# Patient Record
Sex: FEMALE | Race: WHITE | NOT HISPANIC OR LATINO
[De-identification: names, ages, dates, MRNs, and addresses within clinical notes are randomized per-mention and may not be internally consistent; named-entity substitution may affect disease eponyms.]

---

## 2023-08-16 PROBLEM — Z00.00 ENCOUNTER FOR PREVENTIVE HEALTH EXAMINATION: Status: ACTIVE | Noted: 2023-08-16

## 2023-09-20 ENCOUNTER — APPOINTMENT (OUTPATIENT)
Dept: VASCULAR SURGERY | Facility: CLINIC | Age: 28
End: 2023-09-20

## 2023-10-09 ENCOUNTER — NON-APPOINTMENT (OUTPATIENT)
Age: 28
End: 2023-10-09

## 2023-10-09 ENCOUNTER — APPOINTMENT (OUTPATIENT)
Dept: HEART AND VASCULAR | Facility: CLINIC | Age: 28
End: 2023-10-09
Payer: COMMERCIAL

## 2023-10-09 ENCOUNTER — APPOINTMENT (OUTPATIENT)
Dept: VASCULAR SURGERY | Facility: CLINIC | Age: 28
End: 2023-10-09
Payer: COMMERCIAL

## 2023-10-09 PROCEDURE — 99205 OFFICE O/P NEW HI 60 MIN: CPT

## 2023-10-09 PROCEDURE — 93971 EXTREMITY STUDY: CPT

## 2023-10-09 RX ORDER — NORGESTREL AND ETHINYL ESTRADIOL 0.3-0.03MG
0.3-3 KIT ORAL
Refills: 0 | Status: ACTIVE | COMMUNITY

## 2023-10-18 ENCOUNTER — APPOINTMENT (OUTPATIENT)
Dept: MRI IMAGING | Facility: CLINIC | Age: 28
End: 2023-10-18

## 2023-10-24 ENCOUNTER — APPOINTMENT (OUTPATIENT)
Dept: MRI IMAGING | Facility: CLINIC | Age: 28
End: 2023-10-24
Payer: COMMERCIAL

## 2023-10-24 ENCOUNTER — OUTPATIENT (OUTPATIENT)
Dept: OUTPATIENT SERVICES | Facility: HOSPITAL | Age: 28
LOS: 1 days | End: 2023-10-24

## 2023-10-24 PROCEDURE — 72198 MR ANGIO PELVIS W/O & W/DYE: CPT | Mod: 26

## 2023-10-31 ENCOUNTER — TRANSCRIPTION ENCOUNTER (OUTPATIENT)
Age: 28
End: 2023-10-31

## 2024-02-12 ENCOUNTER — APPOINTMENT (OUTPATIENT)
Dept: HEART AND VASCULAR | Facility: CLINIC | Age: 29
End: 2024-02-12
Payer: COMMERCIAL

## 2024-02-12 DIAGNOSIS — I82.890 ACUTE EMBOLISM AND THROMBOSIS OF OTHER SPECIFIED VEINS: ICD-10-CM

## 2024-02-12 LAB
APTT BLD: 29.4 SEC
DEPRECATED D DIMER PPP IA-ACNC: <150 NG/ML DDU
INR PPP: 0.94 RATIO
PT BLD: 10.6 SEC

## 2024-02-12 PROCEDURE — 99213 OFFICE O/P EST LOW 20 MIN: CPT

## 2024-02-13 LAB
FACT IX ACT/NOR PPP: 89 %
FACT X ACT/NOR PPP: 97 %
FACT XI ACT/NOR PPP: 119 %
FACT XII PPP CHRO-ACNC: 137 %

## 2024-02-15 NOTE — PHYSICAL EXAM
[Alert] : alert [No Acute Distress] : no acute distress [Well Nourished] : well nourished [Well Developed] : well developed [Normal Sclera/Conjunctiva] : normal sclera/conjunctiva [EOMI] : extra occular movement intact [No Proptosis] : no proptosis [Normal Oropharynx] : the oropharynx was normal [Thyroid Not Enlarged] : the thyroid was not enlarged [No Thyroid Nodules] : there were no palpable thyroid nodules [No Respiratory Distress] : no respiratory distress [No Accessory Muscle Use] : no accessory muscle use [Clear to Auscultation] : lungs were clear to auscultation bilaterally [Normal Rate] : heart rate was normal  [Normal S1, S2] : normal S1 and S2 [Regular Rhythm] : with a regular rhythm [Pedal Pulses Normal] : the pedal pulses are present [No Edema] : there was no peripheral edema [Normal Bowel Sounds] : normal bowel sounds [Not Tender] : non-tender [Soft] : abdomen soft [Not Distended] : not distended [Normal Post Cervical Nodes] : posterior cervical nodes [Normal Anterior Cervical Nodes] : anterior cervical nodes [Normal Axillary Nodes] : axillary nodes [No Spinal Tenderness] : no spinal tenderness [Spine Straight] : spine straight [No Stigmata of Cushings Syndrome] : no stigmata of cushings syndrome [Normal Gait] : normal gait [Normal Strength/Tone] : muscle strength and tone were normal [No Rash] : no rash [Acanthosis Nigricans___] : no acanthosis nigricans [Normal Reflexes] : deep tendon reflexes were 2+ and symmetric [No Tremors] : no tremors [Oriented x3] : oriented to person, place, and time [Normal Insight/Judgement] : insight and judgment were intact [Normal Affect] : the affect was normal [Normal Mood] : the mood was normal [Recent Memory Normal] : recent memory was not impaired [Remote Memory Normal] : remote memory was not impaired [de-identified] : mild left leg swelling from thigh to ankle compared to right with diffuse varicose vein from thigh to lateral of calf at the level of popliteal [Fully active, able to carry on all pre-disease performance without restriction] : Fully active, able to carry on all pre-disease performance without restriction

## 2024-02-15 NOTE — ASSESSMENT
[FreeTextEntry1] : This is the second office visit for this 28 year old female  with venous issues involving the left leg. Specifically she has fairly extensive varicosities over the left anterior thigh and has had superficial thrombosis in the past. She is not on anticoagulation except when she has had acute thrombosis.  She has tried compression stockings but did not tolerate them well. The thromboses have not been during her pregnancies but the leg definitely becomes more symptomatic at that time. When she was here before we sent her for a vascular lab study which was not suggestive of May Thurner so this diagnosis remains in question. There was also a thought that she may have a mild form of KT syndrome or pelvic congestion, given her multiple pregnancies. She does not have any ankle or foot swelling. As we had discussed last time she was here in the fall I thought she should have a leg venogram and an iliac venogram with pressure measurements to determine whether or not she actually has May Thurner. If she does then it would still be debatable whether she should have a stent as I am not generally in favor of these in young patients. This was recommended elsewhere. We could certainly do sclerotherapy of the varicosities at that time. She has never had a hematology workup so we will draw bloods today for a coagulation profile.

## 2024-02-15 NOTE — HISTORY OF PRESENT ILLNESS
[FreeTextEntry1] :  female here for follow up for left leg swelling, unilateral varicose vein on the ipsilateral leg along with 2 prior episodes of spontaneous superficial thrombosis occurred during her pregnancies with first episode 8 years ago. She initially presented for consultation in 2023 with a diagnosis of MayThurner syndrome at the vein clinic seeking second opinions. LLE venous duplex at that time shows patent and normal size iliac vein. 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 No dilated gonadal veins or evidence of pelvic congestion.  She attempted compression stockings but did not tolerate them well.

## 2024-02-22 VITALS
SYSTOLIC BLOOD PRESSURE: 118 MMHG | RESPIRATION RATE: 16 BRPM | OXYGEN SATURATION: 98 % | DIASTOLIC BLOOD PRESSURE: 71 MMHG | TEMPERATURE: 98 F | HEART RATE: 72 BPM

## 2024-02-22 RX ORDER — CHLORHEXIDINE GLUCONATE 213 G/1000ML
1 SOLUTION TOPICAL ONCE
Refills: 0 | Status: DISCONTINUED | OUTPATIENT
Start: 2024-02-27 | End: 2024-03-12

## 2024-02-22 NOTE — H&P ADULT - HISTORY OF PRESENT ILLNESS
INCOMPLETE    IR: Dr Lambert  Pharmacy:   Escort:    28 year old female  with venous issues involving the left leg. Specifically she has fairly extensive varicosities over the left anterior thigh and has had superficial thrombosis in the past. She is not on anticoagulation except when she has had acute thrombosis. She has tried compression stockings but did not tolerate them well. The thromboses have not been during her pregnancies but the leg definitely becomes more symptomatic at that time. When she was seen by Dr Lambert previously and sent for a vascular lab study which was not suggestive of May Thurner so this diagnosis remains in question. There was also a thought that she may have a mild form of KT syndrome or pelvic congestion, given her multiple pregnancies. She does not have any ankle or foot swelling. As discussed last time she was seen in the fall and Dr Lambert thought she should have a leg venogram and an iliac venogram with pressure measurements to determine whether or not she actually has May Thurner. She is now recommended for lower venogram and pressure measurement extremity.   IR: Dr Lambert  Pharmacy: [facility] [phone]  Escort:      28 year old female  with venous issues involving the left leg. Specifically she has fairly extensive varicosities over the left anterior thigh and has had superficial thrombosis in the past. She is not on anticoagulation except when she has had acute thrombosis. She has tried compression stockings but did not tolerate them well. The thromboses have not been during her pregnancies but the leg definitely becomes more symptomatic at that time. When she was seen by Dr Lambert previously and sent for a vascular lab study which was not suggestive of May Thurner so this diagnosis remains in question. There was also a thought that she may have a mild form of KT syndrome or pelvic congestion, given her multiple pregnancies. She does not have any ankle or foot swelling. As discussed last time she was seen in the fall and Dr Lambert thought she should have a leg venogram and an iliac venogram with pressure measurements to determine whether or not she actually has May Thurner. She is now recommended for lower venogram and pressure measurement extremity.

## 2024-02-22 NOTE — H&P ADULT - ASSESSMENT
28 year old female  with venous issues involving the left leg. Specifically she has fairly extensive varicosities over the left anterior thigh and has had superficial thrombosis in the past. She is not on anticoagulation except when she has had acute thrombosis. She has tried compression stockings but did not tolerate them well. The thromboses have not been during her pregnancies but the leg definitely becomes more symptomatic at that time. When she was seen by Dr Lambert previously and sent for a vascular lab study which was not suggestive of May Thurner so this diagnosis remains in question. There was also a thought that she may have a mild form of KT syndrome or pelvic congestion, given her multiple pregnancies. She does not have any ankle or foot swelling. As discussed last time she was seen in the fall and Dr Lambert thought she should have a leg venogram and an iliac venogram with pressure measurements to determine whether or not she actually has May Thurner. She is now recommended for lower venogram and pressure measurement extremity.    Consent to be obtained by Dr. Lambert and his team   LMP , serum HCG obtained and is ___   Risks & benefits of procedure and alternative therapy have been explained to the patient including but not limited to: allergic reaction, bleeding w/possible need for blood transfusion, infection, renal and vascular compromise, limb damage, emergent surgery. Informed consent obtained and in chart.

## 2024-02-27 ENCOUNTER — OUTPATIENT (OUTPATIENT)
Dept: OUTPATIENT SERVICES | Facility: HOSPITAL | Age: 29
LOS: 1 days | Discharge: HOME | End: 2024-02-27
Payer: COMMERCIAL

## 2024-02-27 VITALS
RESPIRATION RATE: 16 BRPM | OXYGEN SATURATION: 98 % | TEMPERATURE: 98 F | WEIGHT: 154.32 LBS | HEART RATE: 72 BPM | DIASTOLIC BLOOD PRESSURE: 71 MMHG | SYSTOLIC BLOOD PRESSURE: 118 MMHG

## 2024-02-27 DIAGNOSIS — Q27.32 ARTERIOVENOUS MALFORMATION OF VESSEL OF LOWER LIMB: ICD-10-CM

## 2024-02-27 DIAGNOSIS — I87.1 COMPRESSION OF VEIN: ICD-10-CM

## 2024-02-27 LAB
A1C WITH ESTIMATED AVERAGE GLUCOSE RESULT: 5.1 % — SIGNIFICANT CHANGE UP (ref 4–5.6)
ALBUMIN SERPL ELPH-MCNC: 4.5 G/DL — SIGNIFICANT CHANGE UP (ref 3.3–5)
ALP SERPL-CCNC: 71 U/L — SIGNIFICANT CHANGE UP (ref 40–120)
ALT FLD-CCNC: 17 U/L — SIGNIFICANT CHANGE UP (ref 10–45)
ANION GAP SERPL CALC-SCNC: 10 MMOL/L — SIGNIFICANT CHANGE UP (ref 5–17)
APTT BLD: 29.7 SEC — SIGNIFICANT CHANGE UP (ref 24.5–35.6)
AST SERPL-CCNC: 24 U/L — SIGNIFICANT CHANGE UP (ref 10–40)
BASOPHILS # BLD AUTO: 0.04 K/UL — SIGNIFICANT CHANGE UP (ref 0–0.2)
BASOPHILS NFR BLD AUTO: 0.6 % — SIGNIFICANT CHANGE UP (ref 0–2)
BILIRUB SERPL-MCNC: 0.6 MG/DL — SIGNIFICANT CHANGE UP (ref 0.2–1.2)
BUN SERPL-MCNC: 13 MG/DL — SIGNIFICANT CHANGE UP (ref 7–23)
CALCIUM SERPL-MCNC: 9.7 MG/DL — SIGNIFICANT CHANGE UP (ref 8.4–10.5)
CHLORIDE SERPL-SCNC: 102 MMOL/L — SIGNIFICANT CHANGE UP (ref 96–108)
CHOLEST SERPL-MCNC: 176 MG/DL — SIGNIFICANT CHANGE UP
CK MB CFR SERPL CALC: 1.7 NG/ML — SIGNIFICANT CHANGE UP (ref 0–6.7)
CK SERPL-CCNC: 82 U/L — SIGNIFICANT CHANGE UP (ref 25–170)
CO2 SERPL-SCNC: 25 MMOL/L — SIGNIFICANT CHANGE UP (ref 22–31)
CREAT SERPL-MCNC: 0.76 MG/DL — SIGNIFICANT CHANGE UP (ref 0.5–1.3)
EGFR: 108 ML/MIN/1.73M2 — SIGNIFICANT CHANGE UP
EOSINOPHIL # BLD AUTO: 0.06 K/UL — SIGNIFICANT CHANGE UP (ref 0–0.5)
EOSINOPHIL NFR BLD AUTO: 0.9 % — SIGNIFICANT CHANGE UP (ref 0–6)
ESTIMATED AVERAGE GLUCOSE: 100 MG/DL — SIGNIFICANT CHANGE UP (ref 68–114)
GLUCOSE SERPL-MCNC: 92 MG/DL — SIGNIFICANT CHANGE UP (ref 70–99)
HCG SERPL-ACNC: <0 MIU/ML — SIGNIFICANT CHANGE UP
HCT VFR BLD CALC: 43.2 % — SIGNIFICANT CHANGE UP (ref 34.5–45)
HDLC SERPL-MCNC: 49 MG/DL — LOW
HGB BLD-MCNC: 14.8 G/DL — SIGNIFICANT CHANGE UP (ref 11.5–15.5)
IMM GRANULOCYTES NFR BLD AUTO: 0.3 % — SIGNIFICANT CHANGE UP (ref 0–0.9)
INR BLD: 0.98 — SIGNIFICANT CHANGE UP (ref 0.85–1.18)
LIPID PNL WITH DIRECT LDL SERPL: 115 MG/DL — HIGH
LYMPHOCYTES # BLD AUTO: 2.57 K/UL — SIGNIFICANT CHANGE UP (ref 1–3.3)
LYMPHOCYTES # BLD AUTO: 38.9 % — SIGNIFICANT CHANGE UP (ref 13–44)
MAGNESIUM SERPL-MCNC: 2.1 MG/DL — SIGNIFICANT CHANGE UP (ref 1.6–2.6)
MCHC RBC-ENTMCNC: 30.5 PG — SIGNIFICANT CHANGE UP (ref 27–34)
MCHC RBC-ENTMCNC: 34.3 GM/DL — SIGNIFICANT CHANGE UP (ref 32–36)
MCV RBC AUTO: 88.9 FL — SIGNIFICANT CHANGE UP (ref 80–100)
MONOCYTES # BLD AUTO: 0.48 K/UL — SIGNIFICANT CHANGE UP (ref 0–0.9)
MONOCYTES NFR BLD AUTO: 7.3 % — SIGNIFICANT CHANGE UP (ref 2–14)
NEUTROPHILS # BLD AUTO: 3.44 K/UL — SIGNIFICANT CHANGE UP (ref 1.8–7.4)
NEUTROPHILS NFR BLD AUTO: 52 % — SIGNIFICANT CHANGE UP (ref 43–77)
NON HDL CHOLESTEROL: 127 MG/DL — SIGNIFICANT CHANGE UP
NRBC # BLD: 0 /100 WBCS — SIGNIFICANT CHANGE UP (ref 0–0)
PLATELET # BLD AUTO: 223 K/UL — SIGNIFICANT CHANGE UP (ref 150–400)
POTASSIUM SERPL-MCNC: 4.2 MMOL/L — SIGNIFICANT CHANGE UP (ref 3.5–5.3)
POTASSIUM SERPL-SCNC: 4.2 MMOL/L — SIGNIFICANT CHANGE UP (ref 3.5–5.3)
PROT SERPL-MCNC: 6.7 G/DL — SIGNIFICANT CHANGE UP (ref 6–8.3)
PROTHROM AB SERPL-ACNC: 11.2 SEC — SIGNIFICANT CHANGE UP (ref 9.5–13)
RBC # BLD: 4.86 M/UL — SIGNIFICANT CHANGE UP (ref 3.8–5.2)
RBC # FLD: 12.6 % — SIGNIFICANT CHANGE UP (ref 10.3–14.5)
SODIUM SERPL-SCNC: 137 MMOL/L — SIGNIFICANT CHANGE UP (ref 135–145)
TRIGL SERPL-MCNC: 59 MG/DL — SIGNIFICANT CHANGE UP
WBC # BLD: 6.61 K/UL — SIGNIFICANT CHANGE UP (ref 3.8–10.5)
WBC # FLD AUTO: 6.61 K/UL — SIGNIFICANT CHANGE UP (ref 3.8–10.5)

## 2024-02-27 PROCEDURE — 93005 ELECTROCARDIOGRAM TRACING: CPT

## 2024-02-27 PROCEDURE — C1889: CPT

## 2024-02-27 PROCEDURE — 83036 HEMOGLOBIN GLYCOSYLATED A1C: CPT

## 2024-02-27 PROCEDURE — C1887: CPT

## 2024-02-27 PROCEDURE — 85610 PROTHROMBIN TIME: CPT

## 2024-02-27 PROCEDURE — 75820 VEIN X-RAY ARM/LEG: CPT | Mod: 59

## 2024-02-27 PROCEDURE — 85730 THROMBOPLASTIN TIME PARTIAL: CPT

## 2024-02-27 PROCEDURE — C1769: CPT

## 2024-02-27 PROCEDURE — 37241 VASC EMBOLIZE/OCCLUDE VENOUS: CPT

## 2024-02-27 PROCEDURE — C1894: CPT

## 2024-02-27 PROCEDURE — 82550 ASSAY OF CK (CPK): CPT

## 2024-02-27 PROCEDURE — 36012 PLACE CATHETER IN VEIN: CPT

## 2024-02-27 PROCEDURE — 82553 CREATINE MB FRACTION: CPT

## 2024-02-27 PROCEDURE — 84702 CHORIONIC GONADOTROPIN TEST: CPT

## 2024-02-27 PROCEDURE — 85025 COMPLETE CBC W/AUTO DIFF WBC: CPT

## 2024-02-27 PROCEDURE — 93010 ELECTROCARDIOGRAM REPORT: CPT

## 2024-02-27 PROCEDURE — 80061 LIPID PANEL: CPT

## 2024-02-27 PROCEDURE — 75820 VEIN X-RAY ARM/LEG: CPT | Mod: 26,59

## 2024-02-27 PROCEDURE — 80053 COMPREHEN METABOLIC PANEL: CPT

## 2024-02-27 PROCEDURE — 83735 ASSAY OF MAGNESIUM: CPT

## 2024-02-27 PROCEDURE — 36415 COLL VENOUS BLD VENIPUNCTURE: CPT

## 2024-02-27 RX ORDER — CEPHALEXIN 500 MG
1 CAPSULE ORAL
Qty: 12 | Refills: 0
Start: 2024-02-27 | End: 2024-02-29

## 2024-02-27 RX ORDER — OXYCODONE AND ACETAMINOPHEN 5; 325 MG/1; MG/1
1 TABLET ORAL
Qty: 9 | Refills: 0
Start: 2024-02-27 | End: 2024-02-29

## 2024-02-27 NOTE — PROGRESS NOTE ADULT - SUBJECTIVE AND OBJECTIVE BOX
Interventional Cardiology PA SDA Discharge Note    Patient without complaints. Ambulated and voided without difficulties    Afebrile, VSS    Ext:    		Right Femoral Venous Access; Right groin C/D/I-No hematoma, bleed or bruit       Pulses:    intact RAD/DP/PT to baseline     A/P:                  1.	Stable for discharge as per attending  _________ after bed rest, pt voids, groin/wrist stable and 30 minutes of ambulation.  2.	Follow-up with PMD/Cardiologist ___________ in 1-2 weeks  3.	Discharged forms signed and copies in chart

## 2024-03-19 PROBLEM — I82.90 ACUTE EMBOLISM AND THROMBOSIS OF UNSPECIFIED VEIN: Chronic | Status: ACTIVE | Noted: 2024-02-22

## 2024-04-08 ENCOUNTER — APPOINTMENT (OUTPATIENT)
Dept: HEART AND VASCULAR | Facility: CLINIC | Age: 29
End: 2024-04-08
Payer: COMMERCIAL

## 2024-04-08 DIAGNOSIS — I83.93 ASYMPTOMATIC VARICOSE VEINS OF BILATERAL LOWER EXTREMITIES: ICD-10-CM

## 2024-04-08 PROCEDURE — 99214 OFFICE O/P EST MOD 30 MIN: CPT

## 2024-04-09 NOTE — ASSESSMENT
[FreeTextEntry1] : ============================================================= Estefanía Jiménez is a 28 year old female who was initially referred three months ago for a diagnosis of May Thurner syndrome involving the left leg. Her actual complaints were a large tortuous varicosity over the anterior left thigh and symptomatic varicosities in the popliteal area. No ankle or foot swelling. We brought her in and did venography, including her left iliac vein which showed no evidence of May Thurner syndrome or any significant pressure gradient. The tortuous vein over the anterior thigh was sclerosed at that time. She returns today stating that she still has some enlargement of the varicosity in the thigh. She is again pregnant. On physical exam the tortuous vein is visible but firm and not very compressible. I did an ultrasound in the office which shows that the vein is thrombosed. I told her I expected that vein to start shrinking and flattening out over the next a month or two. As far as the other symptomatic varicosities in the popliteal area, I told her that I would refer her to someone who specializes more in that area, most likely Dr Clay. She would clearly wait until she delivers after the current pregnancy.

## 2024-04-09 NOTE — PHYSICAL EXAM
[Alert] : alert [Well Nourished] : well nourished [Well Developed] : well developed [Normal Sclera/Conjunctiva] : normal sclera/conjunctiva [PERRL] : pupils equal, round and reactive to light [Normal Outer Ear/Nose] : the ears and nose were normal in appearance [Normal Hearing] : hearing was normal [No Respiratory Distress] : no respiratory distress [Normal Rate and Effort] : normal respiratory rhythm and effort [Normal Rate] : heart rate was normal  [Regular Rhythm] : with a regular rhythm [No Edema] : there was no peripheral edema [Not Tender] : non-tender [Not Distended] : not distended [No CVA Tenderness] : no ~M costovertebral angle tenderness [Spine Straight] : spine straight [Normal Gait] : normal gait [No Clubbing, Cyanosis] : no clubbing  or cyanosis of the fingernails [No Rash] : no rash [No Skin Lesions] : no skin lesions [Normal Reflexes] : deep tendon reflexes were 2+ and symmetric [No Motor Deficits] : the motor exam was normal [Oriented x3] : oriented to person, place, and time [Normal Insight/Judgement] : insight and judgment were intact [Normal Affect] : the affect was normal [Normal Mood] : the mood was normal [Foot Ulcers] : no foot ulcers [de-identified] : Anterior thigh varicosity s/p embolization is firm, non-tender, non-erythematous. On ultrasound, thrombosis is present.

## 2024-04-09 NOTE — HISTORY OF PRESENT ILLNESS
[FreeTextEntry1] :  recently pregnant female here for follow up of left leg swelling and varicose veins with two prior episodes of SVT during pregnancies 8 years ago. She initially presented for consultation in 2023 with a diagnosis of May Thurner syndrome at the vein clinic seeking second opinions. LLE venous duplex at that time shows patent and normal size iliac vein. 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 No dilated gonadal veins or evidence of pelvic congestion. She attempted compression stockings but did not tolerate them well but continues to wear stockings. She is now s/p venogram which was negative for iliac vein compression or significant pressure differential, negative for May Thurner Syndrome and DSE w/ STS of L thigh varicose vein. Since the procedure, she states her leg swelling has decreased somewhat and a varicositiy of her left knee popliteal fossa has increased somewhat in prominence. She also has been dissatisfied with the firm, prominent appearance of the left thigh varicosity. No other changes to her medical history aside from the pregnancy.